# Patient Record
Sex: FEMALE | Race: WHITE | Employment: FULL TIME | URBAN - METROPOLITAN AREA
[De-identification: names, ages, dates, MRNs, and addresses within clinical notes are randomized per-mention and may not be internally consistent; named-entity substitution may affect disease eponyms.]

---

## 2018-04-16 ENCOUNTER — HOSPITAL ENCOUNTER (OUTPATIENT)
Dept: CT IMAGING | Age: 48
Discharge: HOME | End: 2018-04-16
Attending: INTERNAL MEDICINE
Payer: SELF-PAY

## 2018-04-16 DIAGNOSIS — R07.89 CHEST DISCOMFORT: ICD-10-CM

## 2018-04-16 PROCEDURE — 75571 CT HRT W/O DYE W/CA TEST: CPT

## 2018-04-18 NOTE — PROGRESS NOTES
Called and informed pt per Dr. Ilia Bonilla. Mireya Malone, Calcium Score of 66. Represents mild plaque in artreries. Asked pt to keep her appointment on 04-20-18 and Dr. Ilia Bonilla. Mireya Malone will go over the test results and answer any questions she may have at this visit.  Pt voiced understanding and agreed to do so/wc

## 2018-04-20 PROBLEM — Z72.0 TOBACCO ABUSE: Status: ACTIVE | Noted: 2018-04-20

## 2018-04-20 PROBLEM — R93.1 AGATSTON CORONARY ARTERY CALCIUM SCORE LESS THAN 100: Status: ACTIVE | Noted: 2018-04-20

## 2019-01-25 PROBLEM — E78.5 DYSLIPIDEMIA: Status: ACTIVE | Noted: 2019-01-25

## 2019-04-05 ENCOUNTER — HOSPITAL ENCOUNTER (OUTPATIENT)
Dept: MAMMOGRAPHY | Age: 49
Discharge: HOME | End: 2019-04-05
Attending: OBSTETRICS & GYNECOLOGY
Payer: COMMERCIAL

## 2019-04-05 DIAGNOSIS — Z12.39 BREAST CANCER SCREENING: ICD-10-CM

## 2019-04-05 PROCEDURE — 77063 BREAST TOMOSYNTHESIS BI: CPT

## 2019-04-15 ENCOUNTER — HOSPITAL ENCOUNTER (OUTPATIENT)
Dept: MAMMOGRAPHY | Age: 49
Discharge: HOME | End: 2019-04-15
Attending: OBSTETRICS & GYNECOLOGY
Payer: COMMERCIAL

## 2019-04-15 DIAGNOSIS — R92.8 ABNORMAL SCREENING MAMMOGRAM: ICD-10-CM

## 2019-04-15 PROCEDURE — 77066 DX MAMMO INCL CAD BI: CPT

## 2019-04-15 PROCEDURE — 76642 ULTRASOUND BREAST LIMITED: CPT

## 2019-06-14 ENCOUNTER — HOSPITAL ENCOUNTER (OUTPATIENT)
Dept: MAMMOGRAPHY | Age: 49
Discharge: HOME | End: 2019-06-14
Attending: OBSTETRICS & GYNECOLOGY
Payer: COMMERCIAL

## 2019-06-14 VITALS — TEMPERATURE: 98.3 F | DIASTOLIC BLOOD PRESSURE: 68 MMHG | SYSTOLIC BLOOD PRESSURE: 124 MMHG | HEART RATE: 75 BPM

## 2019-06-14 DIAGNOSIS — R92.8 ABNORMAL MAMMOGRAM OF LEFT BREAST: ICD-10-CM

## 2019-06-14 DIAGNOSIS — N60.01 BENIGN BREAST CYST IN FEMALE, RIGHT: ICD-10-CM

## 2019-06-14 PROCEDURE — 19081 BX BREAST 1ST LESION STRTCTC: CPT

## 2019-06-14 PROCEDURE — 88305 TISSUE EXAM BY PATHOLOGIST: CPT

## 2019-06-14 PROCEDURE — 74011250636 HC RX REV CODE- 250/636: Performed by: OBSTETRICS & GYNECOLOGY

## 2019-06-14 PROCEDURE — 77065 DX MAMMO INCL CAD UNI: CPT

## 2019-06-14 PROCEDURE — 74011000250 HC RX REV CODE- 250: Performed by: OBSTETRICS & GYNECOLOGY

## 2019-06-14 PROCEDURE — 19000 PUNCTURE ASPIR CYST BREAST: CPT

## 2019-06-14 RX ORDER — LIDOCAINE HYDROCHLORIDE 10 MG/ML
2 INJECTION INFILTRATION; PERINEURAL
Status: COMPLETED | OUTPATIENT
Start: 2019-06-14 | End: 2019-06-14

## 2019-06-14 RX ORDER — LIDOCAINE HYDROCHLORIDE 10 MG/ML
5 INJECTION INFILTRATION; PERINEURAL
Status: COMPLETED | OUTPATIENT
Start: 2019-06-14 | End: 2019-06-14

## 2019-06-14 RX ORDER — LIDOCAINE HYDROCHLORIDE AND EPINEPHRINE 10; 10 MG/ML; UG/ML
5 INJECTION, SOLUTION INFILTRATION; PERINEURAL
Status: COMPLETED | OUTPATIENT
Start: 2019-06-14 | End: 2019-06-14

## 2019-06-14 RX ADMIN — LIDOCAINE HYDROCHLORIDE,EPINEPHRINE BITARTRATE 2 MG: 10; .01 INJECTION, SOLUTION INFILTRATION; PERINEURAL at 09:23

## 2019-06-14 RX ADMIN — LIDOCAINE HYDROCHLORIDE 4 ML: 10 INJECTION, SOLUTION INFILTRATION; PERINEURAL at 09:21

## 2019-06-14 RX ADMIN — SODIUM CHLORIDE 250 ML: 900 INJECTION, SOLUTION INTRAVENOUS at 09:20

## 2019-06-14 RX ADMIN — LIDOCAINE HYDROCHLORIDE 2 ML: 10 INJECTION, SOLUTION INFILTRATION; PERINEURAL at 08:32

## 2019-12-17 ENCOUNTER — HOSPITAL ENCOUNTER (OUTPATIENT)
Dept: MAMMOGRAPHY | Age: 49
Discharge: HOME | End: 2019-12-17
Attending: OBSTETRICS & GYNECOLOGY
Payer: COMMERCIAL

## 2019-12-17 DIAGNOSIS — N63.20 LEFT BREAST MASS: ICD-10-CM

## 2019-12-17 DIAGNOSIS — Z09 FOLLOW-UP EXAM, 3-6 MONTHS SINCE PREVIOUS EXAM: ICD-10-CM

## 2019-12-17 PROCEDURE — 77065 DX MAMMO INCL CAD UNI: CPT

## 2019-12-17 PROCEDURE — 76642 ULTRASOUND BREAST LIMITED: CPT

## 2019-12-17 NOTE — LETTER
[facility] [address] Liz Duque MD 
[address] [place], [place] [id number] ORN:[phone] Fax:    [phone] Date:  2020 RE: Patient: Nevin Rinaldi : 1970 Done on: 19 Interpreted by: Monika Peña MD   
  
  
Dear Dr. Daphne Rogel, Thank you for allowing us to care for your patient. Nevin Rinaldi had a breast imaging exam with us on 19. At that time a Biopsy was recommended. The recommendation was due on 2019. A letter was previously sent to Jim Roque her of the need for the exam.  As of this date, we do not have record of this exam being performed. We would appreciate your assistance in contacting the patient and scheduling the appointment. It is required that we have a written order from you for the exam. Orders may be entered into [facility] or you may fax a copy to our facility. Please call [phone] or [phone] to schedule the appointment. If the follow-up study was performed at another facility, please forward those results to us so that we may update our records as required by the FDA. Thank you for your assistance. Sincerely,  
  
[facility] for  [facility]

## 2019-12-18 PROBLEM — K52.9 CHRONIC DIARRHEA: Status: ACTIVE | Noted: 2019-12-18

## 2020-01-27 ENCOUNTER — HOSPITAL ENCOUNTER (OUTPATIENT)
Dept: LAB | Age: 50
Discharge: HOME | End: 2020-01-27
Attending: INTERNAL MEDICINE
Payer: COMMERCIAL

## 2020-01-27 DIAGNOSIS — E78.5 DYSLIPIDEMIA: ICD-10-CM

## 2020-01-27 LAB
CHOLEST SERPL-MCNC: 162 MG/DL
HDLC SERPL-MCNC: 80 MG/DL (ref 40–60)
HDLC SERPL: 2 {RATIO}
LDLC SERPL CALC-MCNC: 63.8 MG/DL
LIPID PROFILE,FLP: ABNORMAL
TRIGL SERPL-MCNC: 91 MG/DL (ref 35–150)
VLDLC SERPL CALC-MCNC: 18.2 MG/DL (ref 6–23)

## 2020-01-27 PROCEDURE — 80061 LIPID PANEL: CPT

## 2020-01-27 PROCEDURE — 36415 COLL VENOUS BLD VENIPUNCTURE: CPT

## 2021-01-04 PROBLEM — N95.1 MENOPAUSAL SYMPTOMS: Status: RESOLVED | Noted: 2021-01-04 | Resolved: 2021-01-04

## 2021-01-04 PROBLEM — N95.1 MENOPAUSAL SYMPTOMS: Status: ACTIVE | Noted: 2021-01-04

## 2021-01-16 ENCOUNTER — HOSPITAL ENCOUNTER (OUTPATIENT)
Dept: MAMMOGRAPHY | Age: 51
Discharge: HOME | End: 2021-01-16
Attending: OBSTETRICS & GYNECOLOGY
Payer: COMMERCIAL

## 2021-01-16 DIAGNOSIS — Z12.31 VISIT FOR SCREENING MAMMOGRAM: ICD-10-CM

## 2021-01-16 PROCEDURE — 77067 SCR MAMMO BI INCL CAD: CPT

## 2021-08-03 PROBLEM — K21.9 GERD (GASTROESOPHAGEAL REFLUX DISEASE): Status: RESOLVED | Noted: 2021-08-03 | Resolved: 2021-08-03

## 2021-08-27 PROBLEM — R94.31 ABNORMAL EKG: Status: ACTIVE | Noted: 2021-08-27

## 2022-01-29 ENCOUNTER — HOSPITAL ENCOUNTER (OUTPATIENT)
Dept: MAMMOGRAPHY | Age: 52
Discharge: HOME | End: 2022-01-29
Attending: OBSTETRICS & GYNECOLOGY
Payer: COMMERCIAL

## 2022-01-29 DIAGNOSIS — Z12.31 SCREENING MAMMOGRAM, ENCOUNTER FOR: ICD-10-CM

## 2022-01-29 PROCEDURE — 77063 BREAST TOMOSYNTHESIS BI: CPT

## 2022-03-18 PROBLEM — K52.9 CHRONIC DIARRHEA: Status: ACTIVE | Noted: 2019-12-18

## 2022-03-18 PROBLEM — R93.1 AGATSTON CORONARY ARTERY CALCIUM SCORE LESS THAN 100: Status: ACTIVE | Noted: 2018-04-20

## 2022-03-19 PROBLEM — Z72.0 TOBACCO ABUSE: Status: ACTIVE | Noted: 2018-04-20

## 2022-03-19 PROBLEM — E78.5 DYSLIPIDEMIA: Status: ACTIVE | Noted: 2019-01-25

## 2022-03-19 PROBLEM — N95.1 MENOPAUSAL SYMPTOMS: Status: ACTIVE | Noted: 2021-01-04

## 2022-03-20 PROBLEM — R94.31 ABNORMAL EKG: Status: ACTIVE | Noted: 2021-08-27

## 2022-03-25 ENCOUNTER — HOSPITAL ENCOUNTER (OUTPATIENT)
Dept: LAB | Age: 52
Discharge: HOME | End: 2022-03-25

## 2022-03-25 PROCEDURE — 88305 TISSUE EXAM BY PATHOLOGIST: CPT

## 2022-06-17 ENCOUNTER — OFFICE VISIT (OUTPATIENT)
Dept: CARDIOLOGY CLINIC | Age: 52
End: 2022-06-17
Payer: COMMERCIAL

## 2022-06-17 VITALS
WEIGHT: 120 LBS | HEIGHT: 65 IN | DIASTOLIC BLOOD PRESSURE: 78 MMHG | HEART RATE: 82 BPM | SYSTOLIC BLOOD PRESSURE: 138 MMHG | BODY MASS INDEX: 19.99 KG/M2

## 2022-06-17 DIAGNOSIS — R93.1 AGATSTON CORONARY ARTERY CALCIUM SCORE LESS THAN 100: ICD-10-CM

## 2022-06-17 DIAGNOSIS — Z72.0 TOBACCO ABUSE: ICD-10-CM

## 2022-06-17 DIAGNOSIS — E78.5 DYSLIPIDEMIA: Primary | ICD-10-CM

## 2022-06-17 DIAGNOSIS — E78.5 DYSLIPIDEMIA: ICD-10-CM

## 2022-06-17 LAB
CHOLEST SERPL-MCNC: 260 MG/DL
HDLC SERPL-MCNC: 86 MG/DL (ref 40–60)
HDLC SERPL: 3 {RATIO}
LDLC SERPL CALC-MCNC: 154.6 MG/DL
TRIGL SERPL-MCNC: 97 MG/DL (ref 35–150)
VLDLC SERPL CALC-MCNC: 19.4 MG/DL (ref 6–23)

## 2022-06-17 PROCEDURE — 99214 OFFICE O/P EST MOD 30 MIN: CPT | Performed by: INTERNAL MEDICINE

## 2022-06-17 RX ORDER — ATORVASTATIN CALCIUM 20 MG/1
20 TABLET, FILM COATED ORAL DAILY
Qty: 30 TABLET | Refills: 4 | Status: SHIPPED | OUTPATIENT
Start: 2022-06-17

## 2022-06-17 ASSESSMENT — ENCOUNTER SYMPTOMS
SHORTNESS OF BREATH: 0
ABDOMINAL PAIN: 0

## 2022-06-17 NOTE — PROGRESS NOTES
[address], [address] [facility], [place], [address] [place] [address]  PHONE: [phone]    Danielito Brower  1970      SUBJECTIVE:   Danielito Brower is a 49 y.o. female seen for a follow up visit regarding the following:     Chief Complaint   Patient presents with    Coronary Artery Disease     elevated calcium       HPI:    78-year-old female comes back for follow-up for history of elevated calcium score chronic abnormal EKG with nonspecific changes. She had stress echo since last visit and she did good exercise level and had no chest pain and had no wall motion abnormality. Unfortunate she continues to smoke and has not been able to stop. She is taking her cholesterol medicine is done well. She denies chest pain      Past Medical History, Past Surgical History, Family history, Social History, and Medications were all reviewed with the patient today and updated as necessary.        Allergies   Allergen Reactions    Nitrofurantoin Hives     Past Medical History:   Diagnosis Date    Abnormal EKG     Bronchitis, chronic (HCC)     Depression     Fatigue     GERD (gastroesophageal reflux disease)     Headache(784.0)     migraine    Insomnia     Labial abscess 09/15/11    left vulva/painful    Ovarian cyst     Pelvic pain in female     Proteinuria     TMJ syndrome     Weight loss     Yeast infection      Past Surgical History:   Procedure Laterality Date    BREAST BIOPSY Left 06/14/2019    stereotactic biopsy for calcs    CARDIAC CATHETERIZATION      in her early 30's-Family hx of Heart disease    COLPOSCOPY  03/25/2022    CYST INCISION AND DRAINAGE Right 06/14/2019    Breast    HYSTERECTOMY (CERVIX STATUS UNKNOWN)  04/23/2007    [facility] Ovaries intact per pt    IMPLANT BREAST SILICONE/EQ Bilateral 7133    LEEP  2004    leep/cervix    DB STEROTACTIC LOC BREAST BIOPSY LEFT Left 6/14/2019    DB STEROTACTIC LOC BREAST BIOPSY LEFT 6/14/2019 [facility]    US BREAST CYST ASPIRATION RIGHT Right 6/14/2019     BREAST CYST ASPIRATION RIGHT 6/14/2019 [facility]     Family History   Problem Relation Age of Onset    Hypertension Maternal Grandfather     Colon Cancer Maternal Grandfather     Thyroid Disease Maternal Grandmother     Breast Cancer Maternal Aunt 46    Heart Disease Maternal Aunt     Cancer Maternal Aunt         breast & lung    Heart Disease Mother     Osteoporosis Mother     Hypertension Mother     Thyroid Disease Mother     Heart Disease Maternal Uncle     Cancer Other         grandfather - intestinal / Shelvia Gondola    Stroke Maternal Grandfather     Heart Disease Maternal Grandfather       Social History     Tobacco Use    Smoking status: Current Every Day Smoker     Packs/day: 0.25    Smokeless tobacco: Never Used   Substance Use Topics    Alcohol use: No       ROS:    Review of Systems   Constitutional: Negative for decreased appetite. Cardiovascular: Negative for dyspnea on exertion, irregular heartbeat and leg swelling. Respiratory: Negative for shortness of breath. Gastrointestinal: Negative for abdominal pain. PHYSICAL EXAM:    /78   Pulse 82   Ht 5' 5\" (1.651 m)   Wt 120 lb (54.4 kg)   BMI 19.97 kg/m²        Wt Readings from Last 3 Encounters:   06/17/22 120 lb (54.4 kg)   01/06/22 114 lb (51.7 kg)   12/17/21 112 lb (50.8 kg)     BP Readings from Last 3 Encounters:   06/17/22 138/78   01/06/22 124/84   12/17/21 (!) 146/80         Physical Exam  Cardiovascular:      Rate and Rhythm: Normal rate. Pulses: Normal pulses. Heart sounds: No gallop. Pulmonary:      Effort: Pulmonary effort is normal.      Breath sounds: No rales. Musculoskeletal:         General: No swelling. Skin:     General: Skin is warm. Neurological:      General: No focal deficit present. Mental Status: She is alert. Medical problems and test results were reviewed with the patient today. No results found for any visits on 06/17/22.   Lab Results   Component Value Date     12/18/2019    K 4.4 12/18/2019     12/18/2019    CO2 26 12/18/2019    BUN 5 12/18/2019    GFRAA 127 12/18/2019     Lab Results   Component Value Date    CHOL 162 01/27/2020    HDL 80 01/27/2020         ASSESSMENT and PLAN    Yolande Lemus was seen today for coronary artery disease. Diagnoses and all orders for this visit:    Dyslipidemia she is currently on her atorvastatin will make sure she checks her lipid profile. -     Cancel: Lipid Panel; Future  -     Lipid Panel; Future    Agatston coronary artery calcium score less than 100 stress echo last December showed normal wall motion. We will continue medical treatment    Tobacco abuse  Lengthy discussion again about stopping smoking. She has had a bad habit she cannot get rid of it. She gets stressed and smokes. Other orders  -     atorvastatin (LIPITOR) 20 MG tablet; Take 1 tablet by mouth daily        [unfilled]      No follow-up provider specified.     Concepcion Guerrero MD  6/17/2022  12:15 PM

## 2022-06-20 ENCOUNTER — TELEPHONE (OUTPATIENT)
Dept: CARDIOLOGY CLINIC | Age: 52
End: 2022-06-20

## 2022-06-20 DIAGNOSIS — E78.5 DYSLIPIDEMIA: Primary | ICD-10-CM

## 2022-06-20 NOTE — TELEPHONE ENCOUNTER
----- Message from Nataly Mary MD sent at 6/20/2022  8:12 AM EDT -----  Call pt her chol is up Is she taking lipitor? If she is increase to 40 mg q d  6/20/22 Called pt with above results/Dr. Faina Parsons response. Pt states that she had not been taking  her Atorvastatin  in the past couple mths. States that she was only given a 30 day supply until she had lipids checked but she was unable to get the labs checked due to work situation. States that she just restarted taking the Atorvastatin 20mg after recent Appt with Dr Bal Pfeiffer. Per Dr Bla Pfeiffer, continue taking the Atorvastatin 20mg and repeat labs in 3 mth. Called and informed pt of Dr Faina Parsons response. She v/u. Lipid order placed.

## 2022-11-18 RX ORDER — ATORVASTATIN CALCIUM 20 MG/1
20 TABLET, FILM COATED ORAL DAILY
Qty: 90 TABLET | Refills: 3 | Status: SHIPPED | OUTPATIENT
Start: 2022-11-18

## 2022-11-18 NOTE — TELEPHONE ENCOUNTER
Requested Prescriptions     Pending Prescriptions Disp Refills    atorvastatin (LIPITOR) 20 MG tablet 90 tablet 3     Sig: Take 1 tablet by mouth daily    Send to Mrs Elle Suggs to sign.

## 2022-11-28 ENCOUNTER — OFFICE VISIT (OUTPATIENT)
Dept: CARDIOLOGY CLINIC | Age: 52
End: 2022-11-28
Payer: COMMERCIAL

## 2022-11-28 VITALS
SYSTOLIC BLOOD PRESSURE: 138 MMHG | HEART RATE: 73 BPM | DIASTOLIC BLOOD PRESSURE: 80 MMHG | BODY MASS INDEX: 20.23 KG/M2 | WEIGHT: 121.4 LBS | HEIGHT: 65 IN

## 2022-11-28 DIAGNOSIS — E78.5 DYSLIPIDEMIA: Primary | ICD-10-CM

## 2022-11-28 DIAGNOSIS — R93.1 AGATSTON CORONARY ARTERY CALCIUM SCORE LESS THAN 100: ICD-10-CM

## 2022-11-28 DIAGNOSIS — E78.5 DYSLIPIDEMIA: ICD-10-CM

## 2022-11-28 DIAGNOSIS — Z72.0 TOBACCO ABUSE: ICD-10-CM

## 2022-11-28 LAB
CHOLEST SERPL-MCNC: 190 MG/DL
HDLC SERPL-MCNC: 98 MG/DL (ref 40–60)
HDLC SERPL: 1.9 {RATIO}
LDLC SERPL CALC-MCNC: 78.4 MG/DL
TRIGL SERPL-MCNC: 68 MG/DL (ref 35–150)
VLDLC SERPL CALC-MCNC: 13.6 MG/DL (ref 6–23)

## 2022-11-28 PROCEDURE — 93000 ELECTROCARDIOGRAM COMPLETE: CPT | Performed by: INTERNAL MEDICINE

## 2022-11-28 PROCEDURE — 99214 OFFICE O/P EST MOD 30 MIN: CPT | Performed by: INTERNAL MEDICINE

## 2022-11-28 ASSESSMENT — ENCOUNTER SYMPTOMS
ABDOMINAL PAIN: 0
SHORTNESS OF BREATH: 0

## 2022-11-28 NOTE — PROGRESS NOTES
[address], [address] [facility], [address] [place] [address]  PHONE: [phone]    Kathy Dao  1970      SUBJECTIVE:   Kathy Dao is a 47 y.o. female seen for a follow up visit regarding the following:     Chief Complaint   Patient presents with    Hyperlipidemia       HPI:    36-year-old female comes back for follow-up with a history of some elevated coronary calcium test negative stress echo last year EKG is only showed nonspecific ST-T wave changes which is been stable. She has a hyperlipidemia she ran out of her statin for period of time and did not get it and her cholesterol went up to 5-60 previous to that it was like 160. She is back on it now needs a follow-up check. She is not been able to stop smoking she is cut down to about a half a pack a day. She stays active and has no chest pain      Past Medical History, Past Surgical History, Family history, Social History, and Medications were all reviewed with the patient today and updated as necessary.        Allergies   Allergen Reactions    Nitrofurantoin Hives     Past Medical History:   Diagnosis Date    Abnormal EKG     Bronchitis, chronic (HCC)     Depression     Fatigue     GERD (gastroesophageal reflux disease)     Headache(784.0)     migraine    Insomnia     Labial abscess 09/15/11    left vulva/painful    Ovarian cyst     Pelvic pain in female     Proteinuria     TMJ syndrome     Weight loss     Yeast infection      Past Surgical History:   Procedure Laterality Date    BREAST BIOPSY Left 06/14/2019    stereotactic biopsy for calcs    CARDIAC CATHETERIZATION      in her early 30's-Family hx of Heart disease    COLPOSCOPY  03/25/2022    CYST INCISION AND DRAINAGE Right 06/14/2019    Breast    HYSTERECTOMY (CERVIX STATUS UNKNOWN)  04/23/2007    [facility] Ovaries intact per pt    IMPLANT BREAST SILICONE/EQ Bilateral 2592    LEEP  2004    leep/cervix    DB STEROTACTIC LOC BREAST BIOPSY LEFT Left 6/14/2019    DB STEROTACTIC LOC BREAST BIOPSY LEFT 6/14/2019 [facility]    US BREAST CYST ASPIRATION RIGHT Right 6/14/2019    US BREAST CYST ASPIRATION RIGHT 6/14/2019 [facility]     Family History   Problem Relation Age of Onset    Hypertension Maternal Grandfather     Colon Cancer Maternal Grandfather     Thyroid Disease Maternal Grandmother     Breast Cancer Maternal Aunt 46    Heart Disease Maternal Aunt     Cancer Maternal Aunt         breast & lung    Heart Disease Mother     Osteoporosis Mother     Hypertension Mother     Thyroid Disease Mother     Heart Disease Maternal Uncle     Cancer Other         grandfather - intestinal / Floyde Reeve    Stroke Maternal Grandfather     Heart Disease Maternal Grandfather       Social History     Tobacco Use    Smoking status: Every Day     Packs/day: 0.25     Types: Cigarettes    Smokeless tobacco: Never   Substance Use Topics    Alcohol use: No       ROS:    Review of Systems   Constitutional: Negative for decreased appetite. Cardiovascular:  Negative for chest pain, dyspnea on exertion and irregular heartbeat. Respiratory:  Negative for shortness of breath. Gastrointestinal:  Negative for abdominal pain. PHYSICAL EXAM:    /80   Pulse 73   Ht 5' 5\" (1.651 m)   Wt 121 lb 6.4 oz (55.1 kg) Comment: with shoes  BMI 20.20 kg/m²        Wt Readings from Last 3 Encounters:   11/28/22 121 lb 6.4 oz (55.1 kg)   06/17/22 120 lb (54.4 kg)   01/06/22 114 lb (51.7 kg)     BP Readings from Last 3 Encounters:   11/28/22 138/80   06/17/22 138/78   01/06/22 124/84         Physical Exam  Constitutional:       General: She is not in acute distress. Cardiovascular:      Rate and Rhythm: Normal rate and regular rhythm. Pulses: Normal pulses. Heart sounds:     No gallop. Musculoskeletal:         General: No swelling. Cervical back: Normal range of motion. Neurological:      Mental Status: She is alert. Medical problems and test results were reviewed with the patient today. Results for orders placed or performed in visit on 11/28/22   EKG 12 Lead    Impression    Normal sinus rhythm rate of 73. Nonspecific ST-T wave changes normal intervals. Lab Results   Component Value Date/Time     12/18/2019 03:39 PM    K 4.4 12/18/2019 03:39 PM     12/18/2019 03:39 PM    CO2 26 12/18/2019 03:39 PM    BUN 5 12/18/2019 03:39 PM    GFRAA 127 12/18/2019 03:39 PM     Lab Results   Component Value Date/Time    CHOL 260 06/17/2022 11:27 AM    HDL 86 06/17/2022 11:27 AM         ASSESSMENT and PLAN    Lili Sherman was seen today for hyperlipidemia. Diagnoses and all orders for this visit:    Dyslipidemia she needs repeat cholesterol level. She will stay on statin therapy  -     EKG 12 Lead  -     Lipid Panel; Future    Agatston coronary artery calcium score less than 100 is elevated score in the past negative stress echo last year she is asymptomatic    Tobacco abuse encouraged to stop smoking altogether she is trying hard. [unfilled]      No follow-up provider specified.     Gustavo Smith MD  11/28/2022  1:13 PM

## 2022-11-30 ENCOUNTER — TELEPHONE (OUTPATIENT)
Dept: CARDIOLOGY CLINIC | Age: 52
End: 2022-11-30

## 2022-11-30 NOTE — TELEPHONE ENCOUNTER
----- Message from Karan Correia LPN sent at [phone]/75/[phone]  2:24 PM EST -----    ----- Message -----  From: Qian Chavis MD  Sent: 11/30/2022   2:06 PM EST  To: Karan Correia LPN    Call pt cholesterol did come back down to 190 LDLs of 78 stable meds

## 2023-01-09 ENCOUNTER — OFFICE VISIT (OUTPATIENT)
Dept: GYNECOLOGY | Age: 53
End: 2023-01-09
Payer: COMMERCIAL

## 2023-01-09 VITALS
BODY MASS INDEX: 20.14 KG/M2 | DIASTOLIC BLOOD PRESSURE: 78 MMHG | HEIGHT: 64 IN | SYSTOLIC BLOOD PRESSURE: 120 MMHG | WEIGHT: 118 LBS

## 2023-01-09 DIAGNOSIS — Z01.419 WELL WOMAN EXAM: Primary | ICD-10-CM

## 2023-01-09 DIAGNOSIS — N95.1 MENOPAUSAL SYMPTOMS: ICD-10-CM

## 2023-01-09 DIAGNOSIS — Z12.31 VISIT FOR SCREENING MAMMOGRAM: ICD-10-CM

## 2023-01-09 DIAGNOSIS — K57.90 DIVERTICULOSIS: ICD-10-CM

## 2023-01-09 PROCEDURE — 99396 PREV VISIT EST AGE 40-64: CPT | Performed by: OBSTETRICS & GYNECOLOGY

## 2023-01-09 RX ORDER — ESTRADIOL 2 MG/1
2 TABLET ORAL DAILY
Qty: 90 TABLET | Refills: 4 | Status: SHIPPED | OUTPATIENT
Start: 2023-01-09

## 2023-01-09 NOTE — PROGRESS NOTES
SHERRELL Dietz is a 47 y.o. female seen for annual GYN exam.  She is doing much better on 2 mg of Estrace and wishes to continue hormone replacement therapy. She had a previous colonoscopy which revealed diverticulosis and has a mammogram ordered. Past Medical History, Past Surgical History, Family history, Social History, and Medications were all reviewed with the patient today and updated as necessary. Current Outpatient Medications   Medication Sig    estradiol (ESTRACE) 2 MG tablet Take 1 tablet by mouth daily    atorvastatin (LIPITOR) 20 MG tablet Take 1 tablet by mouth daily    aspirin 81 MG EC tablet Take 81 mg by mouth daily    ibuprofen (ADVIL;MOTRIN) 200 MG tablet Take by mouth    omeprazole (PRILOSEC) 20 MG delayed release capsule Take 20 mg by mouth daily     No current facility-administered medications for this visit.      Allergies   Allergen Reactions    Nitrofurantoin Hives     Past Medical History:   Diagnosis Date    Abnormal EKG     Bronchitis, chronic (HCC)     Depression     Fatigue     GERD (gastroesophageal reflux disease)     Headache(784.0)     migraine    Insomnia     Labial abscess 09/15/11    left vulva/painful    Ovarian cyst     Pelvic pain in female     Proteinuria     TMJ syndrome     Weight loss     Yeast infection      Past Surgical History:   Procedure Laterality Date    BREAST BIOPSY Left 06/14/2019    stereotactic biopsy for calcs    CARDIAC CATHETERIZATION      in her early 30's-Family hx of Heart disease    COLPOSCOPY  03/25/2022    CYST INCISION AND DRAINAGE Right 06/14/2019    Breast    HYSTERECTOMY (CERVIX STATUS UNKNOWN)  04/23/2007    [facility] Ovaries intact per pt    IMPLANT BREAST SILICONE/EQ Bilateral 4909    LEEP  2004    leep/cervix    DB STEROTACTIC LOC BREAST BIOPSY LEFT Left 6/14/2019    DB STEROTACTIC LOC BREAST BIOPSY LEFT 6/14/2019 [facility]    US BREAST CYST ASPIRATION RIGHT Right 6/14/2019    US BREAST CYST ASPIRATION RIGHT 6/14/2019 [facility]     Family History   Problem Relation Age of Onset    Hypertension Maternal Grandfather     Colon Cancer Maternal Grandfather     Thyroid Disease Maternal Grandmother     Breast Cancer Maternal Aunt 46    Heart Disease Maternal Aunt     Cancer Maternal Aunt         breast & lung    Heart Disease Mother     Osteoporosis Mother     Hypertension Mother     Thyroid Disease Mother     Heart Disease Maternal Uncle     Cancer Other         grandfather - intestinal / Diamond Rias    Stroke Maternal Grandfather     Heart Disease Maternal Grandfather       Social History     Tobacco Use    Smoking status: Every Day     Packs/day: 0.25     Types: Cigarettes    Smokeless tobacco: Never   Substance Use Topics    Alcohol use: No       Social History     Substance and Sexual Activity   Sexual Activity Not on file     OB History    Para Term  AB Living   5 0 0 0 3 2   SAB IAB Ectopic Molar Multiple Live Births   0 0 0 0 0 0       Health Maintenance  Mammogram: 23  Colonoscopy: 3-25-22 repeat 5 years  Bone Density:  Pap smear:2022 [facility]-Ovaries intact       Review of Systems  General: Not Present- Chills, Fever, Fatigue, Insomnia, Hot flashes/Night sweats, Weight gain  Skin: Not Present- Bruising, Change in Wart/Mole, Excessive Sweating, Itching, Nail Changes, New Lesions, Rash, Skin Color Changes and Ulcer. HEENT: Not Present- Headache, Blurred Vision, Double Vision, Glaucoma, Visual Disturbances, Hearing Loss, Ringing in the Ears, Vertigo, Nose Bleed, Bleeding Gums, Hoarseness and Sore Throat. Neck: Not Present- Neck Pain and Neck Swelling. Respiratory: Not Present- Cough, Difficulty Breathing and Difficulty Breathing on Exertion. Breast: Not Present- Breast Mass, Breast Pain, Breast Swelling, Nipple Discharge, Nipple Pain, Recent Breast Size Changes and Skin Changes.   Cardiovascular: Not Present- Abnormal Blood Pressure, Chest Pain, Edema, Fainting / Blacking Out, Palpitations, Shortness of Breath and Swelling of Extremities. Gastrointestinal: Not Present- Abdominal Pain, Abdominal Swelling, Bloating, Change in Bowel Habits, Constipation, Diarrhea, Difficulty Swallowing, Gets full quickly at meals, Nausea, Rectal Bleeding and Vomiting. Female Genitourinary: Not Present- Dysmenorrhea, Dyspareunia, Decreased libido, Excessive Menstrual Bleeding, Menstrual Irregularities, Pelvic Pain, Urinary Complaints, Vaginal Discharge, Vaginal itching/burning, Vaginal odor  Musculoskeletal: Not Present- Joint Pain and Muscle Pain. Neurological: Not Present- Dizziness, Fainting, Headaches and Seizures. Psychiatric: Not Present- Anxiety, Depression, Mood changes and Panic Attacks. Endocrine: Not Present- Appetite Changes, Cold Intolerance, Excessive Thirst, Excessive Urination and Heat Intolerance. Hematology: Not Present- Abnormal Bleeding, Easy Bruising and Enlarged Lymph Nodes. PHYSICAL EXAM:     /78   Ht 5' 4\" (1.626 m)   Wt 118 lb (53.5 kg)   BMI 20.25 kg/m²     Physical Exam   General   Mental Status - Alert. General Appearance - Cooperative. Integumentary   General Characteristics: Overall examination of the patient's skin reveals - no rashes and no suspicious lesions. Head and Neck  Head - normocephalic, atraumatic with no lesions or palpable masses. Neck Note: Normal   Thyroid   Gland Characteristics - normal size and consistency and no palpable nodules. Chest and Lung Exam   Chest and lung exam reveals - on auscultation, normal breath sounds, no adventitious sounds and normal vocal resonance. Breast   Breast - Left - Normal. Right - Normal.     Cardiovascular   Cardiovascular examination reveals - normal heart sounds, regular rate and rhythm with no murmurs.      Abdomen   Inspection: - Inspection Normal.   Palpation/Percussion: Palpation and Percussion of the abdomen reveal - Non Tender, No Rebound tenderness, No Rigidity (guarding), No hepatosplenomegaly, No Palpable abdominal masses and Soft. Auscultation: Auscultation of the abdomen reveals - Bowel sounds normal.     Female Genitourinary     External Genitalia   Vulva: - Normal. Perineum - Normal. Bartholin's Gland - Bilateral - Normal. Clitoris - Normal.   Introitus: Characteristics - Normal.   Urethra: Characteristics - Normal.     Speculum & Bimanual   Vagina: Vaginal Mucosa - Normal  Vaginal Wall: - Normal.   Vaginal Lesions - None. Cervix: Characteristics - Surgically absent  Uterus: Characteristics - Surgically absent  Adnexa: - Normal.   Bladder - Normal.     Peripheral Vascular   Normal    Neuropsychiatric   Examination of related systems reveals - The patient is well-nourished and well-groomed. Mental status exam performed with findings of - Oriented X3 with appropriate mood and affect. Musculoskeletal  Normal      General Lymphatics  Normal           Medical problems and test results were reviewed with the patient today. ASSESSMENT and PLAN    1. Well woman exam  2. Visit for screening mammogram  -     [facility] CAROL ANN DIGITAL SCREEN BILATERAL; Future  3. Menopausal symptoms  -     estradiol (ESTRACE) 2 MG tablet; Take 1 tablet by mouth daily, Disp-90 tablet, R-4Normal  4. Diverticulosis         No follow-ups on file.         Oumar Douglas MD  1/9/2023

## 2023-04-01 ENCOUNTER — HOSPITAL ENCOUNTER (OUTPATIENT)
Dept: MAMMOGRAPHY | Age: 53
End: 2023-04-01
Payer: COMMERCIAL

## 2023-04-01 DIAGNOSIS — Z12.31 VISIT FOR SCREENING MAMMOGRAM: ICD-10-CM

## 2023-04-01 PROCEDURE — 77063 BREAST TOMOSYNTHESIS BI: CPT

## 2023-05-30 ENCOUNTER — OFFICE VISIT (OUTPATIENT)
Age: 53
End: 2023-05-30
Payer: COMMERCIAL

## 2023-05-30 VITALS
DIASTOLIC BLOOD PRESSURE: 80 MMHG | HEART RATE: 70 BPM | BODY MASS INDEX: 20.94 KG/M2 | SYSTOLIC BLOOD PRESSURE: 130 MMHG | WEIGHT: 122 LBS

## 2023-05-30 DIAGNOSIS — Z72.0 TOBACCO ABUSE: ICD-10-CM

## 2023-05-30 DIAGNOSIS — R93.1 AGATSTON CORONARY ARTERY CALCIUM SCORE LESS THAN 100: Primary | ICD-10-CM

## 2023-05-30 DIAGNOSIS — E78.5 DYSLIPIDEMIA: ICD-10-CM

## 2023-05-30 PROCEDURE — 99214 OFFICE O/P EST MOD 30 MIN: CPT | Performed by: INTERNAL MEDICINE

## 2023-05-30 ASSESSMENT — ENCOUNTER SYMPTOMS
RESPIRATORY NEGATIVE: 1
SHORTNESS OF BREATH: 0
EYES NEGATIVE: 1
ABDOMINAL PAIN: 0
GASTROINTESTINAL NEGATIVE: 1
ALLERGIC/IMMUNOLOGIC NEGATIVE: 1
PHOTOPHOBIA: 0
EYE PAIN: 0
CHEST TIGHTNESS: 0
BACK PAIN: 0

## 2023-05-30 NOTE — PROGRESS NOTES
[facility] CARDIOLOGY  [address], [address]  PHONE: [phone]      23    NAME:  Estella Vincent  : 1970  MRN: [id number]         SUBJECTIVE:   Estella Vincent is a 47 y.o. female seen for follow up of:      Chief Complaint   Patient presents with    Follow-up        Cardiac Hx (Reviewed and summarized by me): Chilango Murguiatre patient  1) CAD  Calcium score 18 - 66   Stress Echo 12/10/21 - normal wall motion  2) Lipids   22 - HDL 98, LDL 78.4, Trig 68  3) Tobacco abuse      HPI:  Doing well. Continues to smoke about a half a pack to a pack a day. Last lipid panel as above denies any cardiac symptoms. Past Medical History, Past Surgical History, Family history, Social History, and Medications were all reviewed with the patient today and updated as necessary.        Current Outpatient Medications:     Multiple Vitamin (MVI, CELEBRATE, CHEWABLE TABLET), Take 1 tablet by mouth daily, Disp: , Rfl:     estradiol (ESTRACE) 2 MG tablet, Take 1 tablet by mouth daily, Disp: 90 tablet, Rfl: 4    aspirin 81 MG EC tablet, Take 1 tablet by mouth daily, Disp: , Rfl:     ibuprofen (ADVIL;MOTRIN) 200 MG tablet, Take by mouth, Disp: , Rfl:     omeprazole (PRILOSEC) 20 MG delayed release capsule, Take 1 capsule by mouth daily, Disp: , Rfl:     atorvastatin (LIPITOR) 20 MG tablet, Take 1 tablet by mouth daily, Disp: 90 tablet, Rfl: 3  Allergies   Allergen Reactions    Nitrofurantoin Hives     Past Medical History:   Diagnosis Date    Abnormal EKG     Bronchitis, chronic (HCC)     Depression     Fatigue     GERD (gastroesophageal reflux disease)     Headache(784.0)     migraine    Insomnia     Labial abscess 09/15/11    left vulva/painful    Ovarian cyst     Pelvic pain in female     Proteinuria     TMJ syndrome     Weight loss     Yeast infection      Past Surgical History:   Procedure Laterality Date    BREAST BIOPSY Left 2019    stereotactic biopsy for calcs    CARDIAC

## 2023-07-08 DIAGNOSIS — N95.1 MENOPAUSAL SYMPTOMS: ICD-10-CM

## 2023-07-10 RX ORDER — ESTRADIOL 2 MG/1
TABLET ORAL
Qty: 90 TABLET | Refills: 4 | Status: SHIPPED | OUTPATIENT
Start: 2023-07-10

## 2023-08-18 RX ORDER — ATORVASTATIN CALCIUM 20 MG/1
TABLET, FILM COATED ORAL
Qty: 90 TABLET | Refills: 3 | Status: SHIPPED | OUTPATIENT
Start: 2023-08-18

## 2023-08-18 NOTE — TELEPHONE ENCOUNTER
Requested Prescriptions     Pending Prescriptions Disp Refills    atorvastatin (LIPITOR) 20 MG tablet [Pharmacy Med Name: ATORVASTATIN 20 MG TAB[*]] 90 tablet 3     Sig: TAKE ONE TABLET BY MOUTH ONE TIME DAILY

## 2023-11-15 NOTE — TELEPHONE ENCOUNTER
----- Message from Domenico Urrutia MA sent at 2023  1:43 PM EST -----  Regarding: FW: Medication Refill  Contact: [phone]    ----- Message -----  From: Judson Kumar  Sent: 2023   1:39 PM EST  To: [facility] Clinical Staff  Subject: Medication Refill                                Hello! I just realized I only have a couple of days of my cholesterol medication left and my bottle shows no refills remaining. I was transferred to you when Dr. Ramsey Just retired last year. My prescription information and pharmacy info should be on file, but I take the atorvastatin and use the [facility] pharmacy in [place], [place]. Can someone please call in a refill for me? Thank you!   Miah Lee  Cell: [phone]  : 1970

## 2023-11-16 RX ORDER — ATORVASTATIN CALCIUM 20 MG/1
20 TABLET, FILM COATED ORAL DAILY
Qty: 90 TABLET | Refills: 3 | Status: SHIPPED | OUTPATIENT
Start: 2023-11-16

## 2024-03-30 DIAGNOSIS — N95.1 MENOPAUSAL SYMPTOMS: ICD-10-CM

## 2024-04-02 RX ORDER — ESTRADIOL 2 MG/1
TABLET ORAL
Qty: 90 TABLET | Refills: 4 | Status: SHIPPED | OUTPATIENT
Start: 2024-04-02

## 2024-04-16 ENCOUNTER — OFFICE VISIT (OUTPATIENT)
Age: 54
End: 2024-04-16
Payer: COMMERCIAL

## 2024-04-16 VITALS
DIASTOLIC BLOOD PRESSURE: 100 MMHG | BODY MASS INDEX: 20.49 KG/M2 | WEIGHT: 123 LBS | HEART RATE: 77 BPM | SYSTOLIC BLOOD PRESSURE: 142 MMHG | HEIGHT: 65 IN

## 2024-04-16 DIAGNOSIS — E78.5 DYSLIPIDEMIA: ICD-10-CM

## 2024-04-16 DIAGNOSIS — Z72.0 TOBACCO ABUSE: ICD-10-CM

## 2024-04-16 DIAGNOSIS — R93.1 AGATSTON CORONARY ARTERY CALCIUM SCORE LESS THAN 100: Primary | ICD-10-CM

## 2024-04-16 LAB
CHOLEST SERPL-MCNC: 173 MG/DL
HDLC SERPL-MCNC: 77 MG/DL (ref 40–60)
HDLC SERPL: 2.2
LDLC SERPL CALC-MCNC: 82 MG/DL
TRIGL SERPL-MCNC: 70 MG/DL (ref 35–150)
VLDLC SERPL CALC-MCNC: 14 MG/DL (ref 6–23)

## 2024-04-16 PROCEDURE — 99214 OFFICE O/P EST MOD 30 MIN: CPT | Performed by: INTERNAL MEDICINE

## 2024-04-16 PROCEDURE — 93000 ELECTROCARDIOGRAM COMPLETE: CPT | Performed by: INTERNAL MEDICINE

## 2024-04-16 ASSESSMENT — ENCOUNTER SYMPTOMS
EYES NEGATIVE: 1
BACK PAIN: 0
CHEST TIGHTNESS: 0
RESPIRATORY NEGATIVE: 1
SHORTNESS OF BREATH: 0
GASTROINTESTINAL NEGATIVE: 1
ABDOMINAL PAIN: 0
PHOTOPHOBIA: 0
EYE PAIN: 0
ALLERGIC/IMMUNOLOGIC NEGATIVE: 1

## 2024-04-16 NOTE — PROGRESS NOTES
[facility] CARDIOLOGY  [address] [place] [address], [address]  [place] [address]  PHONE: [phone]      24    NAME:  Carol Howard  : 1970  MRN: [id number]         SUBJECTIVE:   Carol Howard is a 53 y.o. female seen for follow up of:      Chief Complaint   Patient presents with    Hypertension        Cardiac Hx (Reviewed and summarized by me):  N Stoll patient  1) CAD  Calcium score 18 - 66              Stress Echo 12/10/21 - normal wall motion  2) Lipids              22 - HDL 98, LDL 78.4, Trig 68  3) Tobacco abuse  4) Brother with AMI at age 56 (recent), mother first AMI at 38.    ECG: NSR with NS twave changes (Independent review/interpretation by me)    HPI:  Wound chest pain palpitations exercising regularly unfortunately still smoking.  Her brother recently had an MI at age 56.    Past Medical History, Past Surgical History, Family history, Social History, and Medications were all reviewed with the patient today and updated as necessary.       Current Outpatient Medications:     estradiol (ESTRACE) 2 MG tablet, TAKE ONE TABLET BY MOUTH ONE TIME DAILY, Disp: 90 tablet, Rfl: 4    atorvastatin (LIPITOR) 20 MG tablet, Take 1 tablet by mouth daily, Disp: 90 tablet, Rfl: 3    Multiple Vitamin (MVI, CELEBRATE, CHEWABLE TABLET), Take 1 tablet by mouth daily, Disp: , Rfl:     aspirin 81 MG EC tablet, Take 1 tablet by mouth daily, Disp: , Rfl:     ibuprofen (ADVIL;MOTRIN) 200 MG tablet, Take by mouth, Disp: , Rfl:     omeprazole (PRILOSEC) 20 MG delayed release capsule, Take 1 capsule by mouth daily, Disp: , Rfl:   Allergies   Allergen Reactions    Nitrofurantoin Hives     Past Medical History:   Diagnosis Date    Abnormal EKG     Bronchitis, chronic (HCC)     Depression     Fatigue     GERD (gastroesophageal reflux disease)     Headache(784.0)     migraine    Insomnia     Labial abscess 09/15/11    left vulva/painful    Ovarian cyst     Pelvic pain in female     Proteinuria     TMJ

## 2024-04-18 NOTE — PROGRESS NOTES
HPI  Carol Howard is a 53 y.o. female seen for annual GYN exam.  She has no GYN complaints and wishes to continue hormone replacement therapy.    Past Medical History, Past Surgical History, Family history, Social History, and Medications were all reviewed with the patient today and updated as necessary.     Current Outpatient Medications   Medication Sig    estradiol (ESTRACE) 2 MG tablet Take 1 tablet by mouth daily    atorvastatin (LIPITOR) 20 MG tablet Take 1 tablet by mouth daily    Multiple Vitamin (MVI, CELEBRATE, CHEWABLE TABLET) Take 1 tablet by mouth daily    aspirin 81 MG EC tablet Take 1 tablet by mouth daily    ibuprofen (ADVIL;MOTRIN) 200 MG tablet Take by mouth    omeprazole (PRILOSEC) 20 MG delayed release capsule Take 1 capsule by mouth daily     No current facility-administered medications for this visit.     Allergies   Allergen Reactions    Nitrofurantoin Hives     Past Medical History:   Diagnosis Date    Abnormal EKG     Bronchitis, chronic (HCC)     Depression     Fatigue     GERD (gastroesophageal reflux disease)     Headache(784.0)     migraine    Insomnia     Labial abscess 09/15/11    left vulva/painful    Ovarian cyst     Pelvic pain in female     Proteinuria     TMJ syndrome     Weight loss     Yeast infection      Past Surgical History:   Procedure Laterality Date    BREAST BIOPSY Left 06/14/2019    stereotactic biopsy for calcs    CARDIAC CATHETERIZATION      in her early 30's-Family hx of Heart disease    COLPOSCOPY  03/25/2022    CYST INCISION AND DRAINAGE Right 06/14/2019    Breast    HYSTERECTOMY (CERVIX STATUS UNKNOWN)  04/23/2007    [facility] Ovaries intact per pt    IMPLANT BREAST SILICONE/EQ Bilateral 1997    LEEP  2004    leep/cervix    DB STEROTACTIC LOC BREAST BIOPSY LEFT Left 6/14/2019    DB STEROTACTIC LOC BREAST BIOPSY LEFT 6/14/2019 [facility]    US ASP BREAST CYST RIGHT Right 6/14/2019    US BREAST CYST ASPIRATION RIGHT 6/14/2019 [facility]

## 2024-04-19 ENCOUNTER — OFFICE VISIT (OUTPATIENT)
Dept: OBGYN CLINIC | Age: 54
End: 2024-04-19
Payer: COMMERCIAL

## 2024-04-19 VITALS
DIASTOLIC BLOOD PRESSURE: 86 MMHG | WEIGHT: 123 LBS | HEIGHT: 65 IN | BODY MASS INDEX: 20.49 KG/M2 | SYSTOLIC BLOOD PRESSURE: 142 MMHG

## 2024-04-19 DIAGNOSIS — N95.1 MENOPAUSAL SYMPTOMS: ICD-10-CM

## 2024-04-19 DIAGNOSIS — Z12.31 ENCOUNTER FOR SCREENING MAMMOGRAM FOR MALIGNANT NEOPLASM OF BREAST: ICD-10-CM

## 2024-04-19 DIAGNOSIS — Z01.419 WELL WOMAN EXAM WITH ROUTINE GYNECOLOGICAL EXAM: Primary | ICD-10-CM

## 2024-04-19 DIAGNOSIS — Z11.51 SCREENING FOR HUMAN PAPILLOMAVIRUS (HPV): ICD-10-CM

## 2024-04-19 PROCEDURE — 99396 PREV VISIT EST AGE 40-64: CPT | Performed by: OBSTETRICS & GYNECOLOGY

## 2024-04-19 RX ORDER — ESTRADIOL 2 MG/1
2 TABLET ORAL DAILY
Qty: 90 TABLET | Refills: 4 | Status: SHIPPED | OUTPATIENT
Start: 2024-04-19

## 2024-04-19 SDOH — ECONOMIC STABILITY: INCOME INSECURITY: HOW HARD IS IT FOR YOU TO PAY FOR THE VERY BASICS LIKE FOOD, HOUSING, MEDICAL CARE, AND HEATING?: HARD

## 2024-04-19 SDOH — ECONOMIC STABILITY: HOUSING INSECURITY
IN THE LAST 12 MONTHS, WAS THERE A TIME WHEN YOU DID NOT HAVE A STEADY PLACE TO SLEEP OR SLEPT IN A SHELTER (INCLUDING NOW)?: NO

## 2024-04-19 SDOH — ECONOMIC STABILITY: FOOD INSECURITY: WITHIN THE PAST 12 MONTHS, THE FOOD YOU BOUGHT JUST DIDN'T LAST AND YOU DIDN'T HAVE MONEY TO GET MORE.: SOMETIMES TRUE

## 2024-04-19 SDOH — ECONOMIC STABILITY: FOOD INSECURITY: WITHIN THE PAST 12 MONTHS, YOU WORRIED THAT YOUR FOOD WOULD RUN OUT BEFORE YOU GOT MONEY TO BUY MORE.: OFTEN TRUE

## 2024-04-19 SDOH — ECONOMIC STABILITY: TRANSPORTATION INSECURITY
IN THE PAST 12 MONTHS, HAS LACK OF TRANSPORTATION KEPT YOU FROM MEETINGS, WORK, OR FROM GETTING THINGS NEEDED FOR DAILY LIVING?: NO

## 2024-04-27 LAB
COLLECTION METHOD: NORMAL
CYTOLOGIST CVX/VAG CYTO: NORMAL
CYTOLOGY CVX/VAG DOC THIN PREP: NORMAL
HPV APTIMA: NEGATIVE
Lab: NORMAL
OTHER PT INFO: NORMAL
PAP SOURCE: NORMAL
PATH REPORT.FINAL DX SPEC: NORMAL
PREV CYTO INFO: NEGATIVE
PREV TREATMENT RESULTS: NORMAL
PREV TREATMENT: NORMAL
STAT OF ADQ CVX/VAG CYTO-IMP: NORMAL

## 2024-05-24 ENCOUNTER — HOSPITAL ENCOUNTER (OUTPATIENT)
Dept: MAMMOGRAPHY | Age: 54
Discharge: HOME | End: 2024-05-24
Attending: OBSTETRICS & GYNECOLOGY
Payer: COMMERCIAL

## 2024-05-24 VITALS — HEIGHT: 65 IN | WEIGHT: 116 LBS | BODY MASS INDEX: 19.33 KG/M2

## 2024-05-24 DIAGNOSIS — Z12.31 ENCOUNTER FOR SCREENING MAMMOGRAM FOR MALIGNANT NEOPLASM OF BREAST: ICD-10-CM

## 2024-05-24 PROCEDURE — 77063 BREAST TOMOSYNTHESIS BI: CPT

## 2025-01-17 NOTE — TELEPHONE ENCOUNTER
MEDICATION REFILL REQUEST      Name of Medication:  atorvastatin (LIPITOR) 20 MG tablet   Dose:    Frequency:    Quantity:  3  Days' supply:  90      Pharmacy Name/Location:      [facility]  [id number]  [place]    Established appt 4/3

## 2025-01-17 NOTE — TELEPHONE ENCOUNTER
Last office visit 4/16/2024, next office visit 4/3/2025.    Requested Prescriptions     Pending Prescriptions Disp Refills    atorvastatin (LIPITOR) 20 MG tablet 90 tablet 0     Sig: Take 1 tablet by mouth daily

## 2025-01-20 RX ORDER — ATORVASTATIN CALCIUM 20 MG/1
20 TABLET, FILM COATED ORAL DAILY
Qty: 90 TABLET | Refills: 0 | Status: SHIPPED | OUTPATIENT
Start: 2025-01-20

## 2025-04-03 ENCOUNTER — OFFICE VISIT (OUTPATIENT)
Age: 55
End: 2025-04-03
Payer: COMMERCIAL

## 2025-04-03 VITALS
BODY MASS INDEX: 18.33 KG/M2 | HEIGHT: 65 IN | SYSTOLIC BLOOD PRESSURE: 162 MMHG | DIASTOLIC BLOOD PRESSURE: 86 MMHG | WEIGHT: 110 LBS | HEART RATE: 93 BPM

## 2025-04-03 DIAGNOSIS — K21.9 GASTROESOPHAGEAL REFLUX DISEASE WITHOUT ESOPHAGITIS: ICD-10-CM

## 2025-04-03 DIAGNOSIS — R93.1 AGATSTON CORONARY ARTERY CALCIUM SCORE LESS THAN 100: ICD-10-CM

## 2025-04-03 DIAGNOSIS — Z72.0 TOBACCO ABUSE: ICD-10-CM

## 2025-04-03 DIAGNOSIS — E78.5 DYSLIPIDEMIA: Primary | ICD-10-CM

## 2025-04-03 PROCEDURE — 99214 OFFICE O/P EST MOD 30 MIN: CPT | Performed by: INTERNAL MEDICINE

## 2025-04-03 PROCEDURE — 93000 ELECTROCARDIOGRAM COMPLETE: CPT | Performed by: INTERNAL MEDICINE

## 2025-04-03 RX ORDER — LOSARTAN POTASSIUM 25 MG/1
25 TABLET ORAL DAILY
Qty: 90 TABLET | Refills: 3 | Status: SHIPPED | OUTPATIENT
Start: 2025-04-03

## 2025-04-03 ASSESSMENT — ENCOUNTER SYMPTOMS
ABDOMINAL PAIN: 0
EYE PAIN: 0
RESPIRATORY NEGATIVE: 1
BACK PAIN: 0
CHEST TIGHTNESS: 0
ALLERGIC/IMMUNOLOGIC NEGATIVE: 1
SHORTNESS OF BREATH: 0
PHOTOPHOBIA: 0
GASTROINTESTINAL NEGATIVE: 1
EYES NEGATIVE: 1

## 2025-04-03 NOTE — PROGRESS NOTES
pain.   Endocrine: Negative.  Negative for cold intolerance and heat intolerance.   Genitourinary: Negative.  Negative for dysuria.   Musculoskeletal: Negative.  Negative for back pain and joint swelling.   Skin: Negative.  Negative for rash.   Allergic/Immunologic: Negative.  Negative for immunocompromised state.   Neurological: Negative.  Negative for dizziness, syncope and light-headedness.   Hematological: Negative.  Does not bruise/bleed easily.   Psychiatric/Behavioral: Negative.  Negative for suicidal ideas.            PHYSICAL EXAM:  Physical Exam  Constitutional:       General: She is not in acute distress.     Appearance: She is not ill-appearing.   HENT:      Head: Normocephalic and atraumatic.      Nose: No congestion.      Mouth/Throat:      Mouth: Mucous membranes are moist.   Eyes:      Extraocular Movements: Extraocular movements intact.      Pupils: Pupils are equal, round, and reactive to light.   Cardiovascular:      Rate and Rhythm: Normal rate and regular rhythm.      Heart sounds: No murmur heard.     No friction rub. No gallop.   Pulmonary:      Effort: No respiratory distress.      Breath sounds: No wheezing or rhonchi.   Musculoskeletal:         General: No swelling.      Cervical back: Normal range of motion.      Right lower leg: No edema.      Left lower leg: No edema.   Skin:     General: Skin is warm and dry.      Findings: No rash.   Neurological:      General: No focal deficit present.      Mental Status: She is oriented to person, place, and time.   Psychiatric:         Mood and Affect: Mood normal.         Behavior: Behavior normal.         Judgment: Judgment normal.          BP (!) 162/86   Pulse 93   Ht 1.651 m (5' 5\")   Wt 49.9 kg (110 lb)   BMI 18.30 kg/m²      Wt Readings from Last 10 Encounters:   04/03/25 49.9 kg (110 lb)   05/24/24 52.6 kg (116 lb)   04/19/24 55.8 kg (123 lb)   04/16/24 55.8 kg (123 lb)   05/30/23 55.3 kg (122 lb)   01/09/23 53.5 kg (118 lb)   11/28/22

## 2025-05-01 NOTE — TELEPHONE ENCOUNTER
Pt calling asking for refill that should have been sent in at last apr    MEDICATION REFILL REQUEST      Name of Medication:  Atorvastatin  Dose:  20 mg  Frequency:  QD  Quantity:  90  Days' supply:  90      Pharmacy Name/Location:  [phone]

## 2025-05-01 NOTE — TELEPHONE ENCOUNTER
Last office visit 4/3/2025.    Requested Prescriptions     Pending Prescriptions Disp Refills    atorvastatin (LIPITOR) 20 MG tablet [Pharmacy Med Name: ATORVASTATIN 20 MG TAB[*]] 90 tablet 3     Sig: TAKE ONE TABLET BY MOUTH ONE TIME DAILY

## 2025-05-01 NOTE — TELEPHONE ENCOUNTER
Returned patient\'s call.  I informed her Dr. Douglas is out of the office for the rest of the day and that he will sign the prescription tomorrow.  Patient verbalized understanding.

## 2025-05-02 RX ORDER — ATORVASTATIN CALCIUM 20 MG/1
20 TABLET, FILM COATED ORAL DAILY
Qty: 90 TABLET | Refills: 3 | Status: SHIPPED | OUTPATIENT
Start: 2025-05-02